# Patient Record
Sex: MALE | Race: BLACK OR AFRICAN AMERICAN | NOT HISPANIC OR LATINO | URBAN - METROPOLITAN AREA
[De-identification: names, ages, dates, MRNs, and addresses within clinical notes are randomized per-mention and may not be internally consistent; named-entity substitution may affect disease eponyms.]

---

## 2022-07-01 ENCOUNTER — OFFICE (OUTPATIENT)
Dept: URBAN - METROPOLITAN AREA CLINIC 11 | Facility: CLINIC | Age: 52
End: 2022-07-01
Payer: COMMERCIAL

## 2022-07-01 VITALS
WEIGHT: 193 LBS | SYSTOLIC BLOOD PRESSURE: 123 MMHG | DIASTOLIC BLOOD PRESSURE: 73 MMHG | HEIGHT: 70 IN | OXYGEN SATURATION: 100 % | HEART RATE: 66 BPM

## 2022-07-01 DIAGNOSIS — Z12.11 ENCOUNTER FOR SCREENING FOR MALIGNANT NEOPLASM OF COLON: ICD-10-CM

## 2022-07-01 DIAGNOSIS — R10.13 EPIGASTRIC PAIN: ICD-10-CM

## 2022-07-01 PROCEDURE — 99203 OFFICE O/P NEW LOW 30 MIN: CPT | Performed by: INTERNAL MEDICINE

## 2022-07-01 NOTE — SERVICENOTES
We discussed his recent issues with dyspepsia, sx of gas/bloating, potential causes, dietary issues, and his overall improvement.  We discussed warning sx/sx that would prompt a GI evaluation such as by EGD, and he agreed to f/u if there are issues. We discussed colon cancer screening options including colonoscopy/cologuard, the risks of each, risks of missed lesions (up to 10% with cologuard for colon cancer/more for polyps), his negative family hx, and with his work scheduled he wanted to proceed with the cologuard. We also discussed that if this were positive, that his colonoscopy would not be screening. He agreed to proceed.

## 2022-07-01 NOTE — SERVICEHPINOTES
Pt presents to discuss a screening colonoscopy.  Pt stated that he had been doing well.  He has not had issues with changes in his bowel patterns and has not had rectal bleeding.  He has not issues with unexpected weight loss.  He has not had n/v, abdominal pain, dysphagia, heartburn or reflux issues.  He stated that prior to changing his diet to a more fish and fresher diet that he had had some issues with heartburn at times. 
br
whitney 
He has had some issues with gas and belching.  For a while it was a constant issue.  He was not certain of what got it started. He did take jenna seltzer which helped and then stopped his cokes.  The sx improved.  This is the sx set that prompted the referral to clinic.  He has been consistent with his diet changes and the sx resolved. He was not certain of what got the sx started and they lasted for about a week or so. 
whitney Du works out regularly and eats fairly cleanly wtih mostly fish and vegetables/fruits.  He does not drink ETOH. whitney olson He has not had a family hx of colon cancer or colon polyps. 
whitney olson   He has not had a prior colonoscopy.